# Patient Record
Sex: FEMALE | Race: AMERICAN INDIAN OR ALASKA NATIVE
[De-identification: names, ages, dates, MRNs, and addresses within clinical notes are randomized per-mention and may not be internally consistent; named-entity substitution may affect disease eponyms.]

---

## 2018-11-30 ENCOUNTER — HOSPITAL ENCOUNTER (EMERGENCY)
Dept: HOSPITAL 14 - H.ER | Age: 28
Discharge: HOME | End: 2018-11-30
Payer: COMMERCIAL

## 2018-11-30 VITALS
TEMPERATURE: 97.7 F | DIASTOLIC BLOOD PRESSURE: 66 MMHG | HEART RATE: 65 BPM | SYSTOLIC BLOOD PRESSURE: 105 MMHG | OXYGEN SATURATION: 99 %

## 2018-11-30 VITALS — RESPIRATION RATE: 18 BRPM

## 2018-11-30 DIAGNOSIS — F41.9: ICD-10-CM

## 2018-11-30 DIAGNOSIS — R42: Primary | ICD-10-CM

## 2018-11-30 LAB
BASOPHILS # BLD AUTO: 0.1 K/UL (ref 0–0.2)
BASOPHILS NFR BLD: 0.6 % (ref 0–2)
BUN SERPL-MCNC: 13 MG/DL (ref 7–17)
CALCIUM SERPL-MCNC: 8.9 MG/DL (ref 8.4–10.2)
EOSINOPHIL # BLD AUTO: 0.1 K/UL (ref 0–0.7)
EOSINOPHIL NFR BLD: 0.6 % (ref 0–4)
ERYTHROCYTE [DISTWIDTH] IN BLOOD BY AUTOMATED COUNT: 14 % (ref 11.5–14.5)
GFR NON-AFRICAN AMERICAN: > 60
HGB BLD-MCNC: 11.9 G/DL (ref 12–16)
LYMPHOCYTES # BLD AUTO: 1.7 K/UL (ref 1–4.3)
LYMPHOCYTES NFR BLD AUTO: 18 % (ref 20–40)
MCH RBC QN AUTO: 26.9 PG (ref 27–31)
MCHC RBC AUTO-ENTMCNC: 31.7 G/DL (ref 33–37)
MCV RBC AUTO: 84.7 FL (ref 81–99)
MONOCYTES # BLD: 0.5 K/UL (ref 0–0.8)
MONOCYTES NFR BLD: 5.2 % (ref 0–10)
NEUTROPHILS # BLD: 7 K/UL (ref 1.8–7)
NEUTROPHILS NFR BLD AUTO: 75.6 % (ref 50–75)
NRBC BLD AUTO-RTO: 0 % (ref 0–0)
PLATELET # BLD: 278 K/UL (ref 130–400)
PMV BLD AUTO: 10 FL (ref 7.2–11.7)
RBC # BLD AUTO: 4.41 MIL/UL (ref 3.8–5.2)
WBC # BLD AUTO: 9.3 K/UL (ref 4.8–10.8)

## 2018-11-30 PROCEDURE — 80048 BASIC METABOLIC PNL TOTAL CA: CPT

## 2018-11-30 PROCEDURE — 93005 ELECTROCARDIOGRAM TRACING: CPT

## 2018-11-30 PROCEDURE — 81025 URINE PREGNANCY TEST: CPT

## 2018-11-30 PROCEDURE — 85025 COMPLETE CBC W/AUTO DIFF WBC: CPT

## 2018-11-30 PROCEDURE — 99284 EMERGENCY DEPT VISIT MOD MDM: CPT

## 2018-11-30 PROCEDURE — 96360 HYDRATION IV INFUSION INIT: CPT

## 2018-11-30 NOTE — ED PDOC
HPI: General Adult


Time Seen by Provider: 18 09:17


Chief Complaint (Nursing): Dizziness/Lightheaded


Chief Complaint (Provider): Dizziness


History Per: Patient


History/Exam Limitations: no limitations


Onset/Duration Of Symptoms: Days (x1)


Current Symptoms Are (Timing): Still Present


Additional Complaint(s): 


Hola Taylor is a 28 year old female presenting for evaluation of dizziness, 

nausea, and vomiting onset this morning. Patient states she woke up feeling di

zziness described as a room spinning sensation and nausea. Patient reports she 

sat down to take a shower due to her nausea and had 1 episode of vomiting. 

Patient denies any known alleviating or exacerbating factors at this time. 

Patient reports a history of anxiety, but states her current symptoms are 

different from her previous panic attacks. Patient otherwise denies any 

headache, visual changes, fevers, chills, chest pain, cough, acute shortness of 

breath, abdominal pain, diarrhea, constipation, frequency, dysuria, or 

hematuria.  No numbness, tingles. 








Past Medical History


Reviewed: Historical Data, Nursing Documentation, Vital Signs


Vital Signs: 





                                Last Vital Signs











Temp  98.1 F   18 09:05


 


Pulse  61   18 09:05


 


Resp  18   18 09:05


 


BP  115/69   18 09:05


 


Pulse Ox  100   18 09:05














- Medical History


PMH: Anxiety, Asthma





- Surgical History


Surgical History: No Surg Hx





- Family History


Family History: States: Unknown Family Hx





- Home Medications


Home Medications: 


                                Ambulatory Orders











 Medication  Instructions  Recorded


 


Meclizine [Meclizine*] 25 mg PO Q12 PRN #10 tab 18














- Allergies


Allergies/Adverse Reactions: 


                                    Allergies











Allergy/AdvReac Type Severity Reaction Status Date / Time


 


nickel Allergy  ANAPHYLAXIS Verified 18 09:16














Review of Systems


ROS Statement: Except As Marked, All Systems Reviewed And Found Negative


Constitutional: Negative for: Fever, Chills


Eyes: Negative for: Vision Change


Cardiovascular: Negative for: Chest Pain, Palpitations, Light Headedness


Respiratory: Negative for: Cough, Shortness of Breath


Gastrointestinal: Positive for: Nausea, Vomiting.  Negative for: Abdominal Pain,

 Diarrhea, Constipation


Genitourinary Female: Negative for: Dysuria, Frequency, Incontinence, Hematuria


Neurological: Positive for: Dizziness.  Negative for: Weakness, Numbness, 

Headache





Physical Exam





- Reviewed


Nursing Documentation Reviewed: Yes


Vital Signs Reviewed: Yes





- Physical Exam


Appears: Positive for: Non-toxic, No Acute Distress


Head Exam: Positive for: ATRAUMATIC, NORMAL INSPECTION, NORMOCEPHALIC


Skin: Positive for: Normal Color, Warm, Dry.  Negative for: Rash


Eye Exam: Positive for: EOMI, Normal appearance, PERRL


ENT: Positive for: Normal ENT Inspection


Neck: Positive for: Normal, Painless ROM, Supple


Cardiovascular/Chest: Positive for: Regular Rate, Rhythm.  Negative for: Murmur


Respiratory: Positive for: Normal Breath Sounds.  Negative for: Respiratory 

Distress


Gastrointestinal/Abdominal: Positive for: Normal Exam, Soft.  Negative for: 

Tenderness


Back: Positive for: Normal Inspection.  Negative for: L CVA Tenderness, R CVA 

Tenderness, Vertebral Tenderness


Extremity: Positive for: Normal ROM.  Negative for: Pedal Edema, Deformity


Neurologic/Psych: Positive for: Alert, CNs II-XII (intact), Oriented.  Negative 

for: Motor/Sensory Deficits





- Laboratory Results


Result Diagrams: 


                                 18 10:01





                                 18 10:01


Interpretation Of Abn Labs: no acute





- ECG


ECG: Positive for: Interpreted By Me, Viewed By Me


ECG Rhythm: Positive for: Normal QRS, Normal ST Segment, Sinus Rhythm


O2 Sat by Pulse Oximetry: 100


Pulse Ox Interpretation: Normal





- Progress


ED Course And Treament: 





1127:  Stable.  AAOx3.  Pain free.  Tolerated PO.  Fu with pcp.  No dizziness. 





Medical Decision Making


Medical Decision Makin


Plan:


-EKG


-BMP


-Udip


-CBC


-Meclizine 25mg IV


-1L NS IVB


-Reevaluation





-------------------------------------------

-----------------------------------------------------------------------


Scribe Attestation: 


Documented by Raul Melendrez, acting as a scribe for John Tellez MD.





Provider Scribe Attestation:


All medical record entries made by the Scribe were at my direction and 

personally dictated by me. I have reviewed the chart and agree that the record 

accurately reflects my personal performance of the history, physical exam, 

medical decision making, and the department course for this patient. I have also

 personally directed, reviewed, and agree with the discharge instructions and 

disposition.











Disposition





- Clinical Impression


Clinical Impression: 


 Dizziness








- Patient ED Disposition


Is Patient to be Admitted: No


Counseled Patient/Family Regarding: Studies Performed, Diagnosis, Need For 

Followup, Rx Given





- Disposition


Referrals: 


[facility] [Outside] - 18


Disposition: Routine/Home


Disposition Time: 11:28


Condition: STABLE


Additional Instructions: 


Return if not better in 3 days. 


Prescriptions: 


Meclizine [Meclizine*] 25 mg PO Q12 PRN #10 tab


 PRN Reason: Dizziness


Instructions:  Vertigo (a Type of Dizziness)


Forms:  [facility] (English), [facility] ED School/Work Excuse

## 2018-11-30 NOTE — CARD
--------------- APPROVED REPORT --------------





Date of service: 11/30/2018



EKG Measurement

Heart [id number]

GA 116P3

[id number]

[id number]

[id number]



<Conclusion>

Sinus bradycardia

Otherwise normal ECG

## 2019-04-08 ENCOUNTER — HOSPITAL ENCOUNTER (EMERGENCY)
Dept: HOSPITAL 14 - H.ER | Age: 29
Discharge: HOME | End: 2019-04-08
Payer: COMMERCIAL

## 2019-04-08 VITALS — DIASTOLIC BLOOD PRESSURE: 70 MMHG | SYSTOLIC BLOOD PRESSURE: 120 MMHG | HEART RATE: 70 BPM | TEMPERATURE: 98.6 F

## 2019-04-08 VITALS — RESPIRATION RATE: 20 BRPM | OXYGEN SATURATION: 98 %

## 2019-04-08 VITALS — BODY MASS INDEX: 26 KG/M2
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
## 2019-04-08 DIAGNOSIS — S99.911A: Primary | ICD-10-CM

## 2019-04-08 NOTE — RAD
Date of service: 



04/08/2019



PROCEDURE:  Right Ankle Radiographs.



HISTORY:

 right ankle pain injury 



COMPARISON:

None available.



TECHNIQUE:

3 views obtained.



FINDINGS:



BONES:

Bone alignment and mineralization are normal.  There is no acute 

displaced fracture or bone destruction.  A small well corticated 

ossific density posterior superior to the navicular is most 

compatible with an accessory ossifications center 



JOINTS:

Normal. No osteoarthritis. Ankle mortise maintained. Talar dome intact



SOFT TISSUES:

There is mild lateral soft tissue swelling. 



OTHER FINDINGS:

None.



IMPRESSION:

No acute fracture or dislocation.  Mild lateral soft tissue swelling.

## 2019-04-08 NOTE — ED PDOC
Lower Extremity Pain/Injury


Time Seen by Provider: 04/08/19 10:25


Chief Complaint (Nursing): Lower Extremity Problem/Injury


Chief Complaint (Provider): Lower Extremity Problem/Injury


History Per: Patient


History/Exam Limitations: no limitations


Onset/Duration Of Symptoms: Days (x 3)


Current Symptoms Are (Timing): Still Present


Severity: Moderate


Additional Complaint(s): 


29 year old female with no significant medical history presents to the ED for 

evaluation of a right ankle injury that occurred three days ago. Patient reports

she originally injured her ankle on Saturday and then again injured it yesterday

by twisting it. She treated the injury by elevating, icing and taking Motrin. 

Patient is able to walk but is limping with pain. Denies other injury or 

complaints. 





PMD: none provided 








- Ankle/Foot


Description Of Injury: Twisted





Past Medical History


Reviewed: Historical Data, Nursing Documentation, Vital Signs


Vital Signs: 





                                Last Vital Signs











Temp  98.2 F   04/08/19 09:31


 


Pulse  58 L  04/08/19 09:31


 


Resp  20   04/08/19 09:50


 


BP  124/79   04/08/19 09:31


 


Pulse Ox  98   04/08/19 09:50














- Medical History


PMH: Anxiety, Asthma





- Surgical History


Surgical History: No Surg Hx





- Family History


Family History: States: Unknown Family Hx





- Home Medications


Home Medications: 


                                Ambulatory Orders











 Medication  Instructions  Recorded


 


Meclizine [Meclizine*] 25 mg PO Q12 PRN #10 tab 11/30/18


 


Naproxen 500 mg PO BID #20 tab 04/08/19














- Allergies


Allergies/Adverse Reactions: 


                                    Allergies











Allergy/AdvReac Type Severity Reaction Status Date / Time


 


nickel Allergy  ANAPHYLAXIS Verified 04/08/19 09:50














Review of Systems


ROS Statement: Except As Marked, All Systems Reviewed And Found Negative


Musculoskeletal: Positive for: Foot Pain (right ankle injury)





Physical Exam





- Reviewed


Nursing Documentation Reviewed: Yes


Vital Signs Reviewed: Yes





- Physical Exam


Appears: Positive for: No Acute Distress


Skin: Positive for: Normal Color, Warm, Dry


Eye Exam: Positive for: Normal appearance


Extremity: Positive for: Tenderness (mild tenderness to medial malleolus; no 

swelling), Swelling (swelling and tenderness to the right lateral malleolus), 

Other (limited ROM at ankle).  Negative for: Normal ROM


Neurological/Psych: Positive for: Awake, Alert, Normal Tone, Oriented (x 3).  

Negative for: Motor/Sensory Deficits





- ECG


O2 Sat by Pulse Oximetry: 98 (RA)


Pulse Ox Interpretation: Normal





- Progress


Re-evaluation Time: 13:39


Condition: Re-examined, Improved





Medical Decision Making


Medical Decision Making: 


10:48 


Impression: right ankle pain and injury


Initial Plan:


--Right ankle x-ray 


--Motrin 600 mg PO 





11:45 


Ordered Tramadol because pain persists. 





12:53


X-ray 


FINDINGS:





BONES:


Bone alignment and mineralization are normal.  There is no acute displaced 

fracture or bone destruction.  A small well corticated ossific density posterior

 superior to the navicular is most compatible with an accessory ossifications 

center 





JOINTS:


Normal. No osteoarthritis. Ankle mortise maintained. Talar dome intact





SOFT TISSUES:


There is mild lateral soft tissue swelling. 





OTHER FINDINGS:


None.





IMPRESSION:


No acute fracture or dislocation.  Mild lateral soft tissue swelling.








-----------------------------

--------------------------------------------------------------------


Scribe Attestation:


Documented by Renetta Watts, acting as a scribe for Chris Ordoñez MD 


 


Provider Scribe Attestation:


All medical record entries made by the Scribe were at my direction and 

personally dictated by me. I have reviewed the chart and agree that the record 

accurately reflects my personal performance of the history, physical exam, 

medical decision making, and the department course for this patient. I have also

 personally directed, reviewed, and agree with the discharge instructions and 

disposition








Disposition





- Clinical Impression


Clinical Impression: 


 Ankle injury








- Patient ED Disposition


Is Patient to be Admitted: No


Doctor Will See Patient In The: Office


Counseled Patient/Family Regarding: Studies Performed, Diagnosis, Need For 

Followup





- Disposition


Referrals: 


Podiatry Clinic [Outside]


Disposition: Routine/Home


Disposition Time: 13:39


Condition: GOOD


Additional Instructions: 





PASHA RODRIGUEZ, thank you for letting us take care of you today. Your provider

 was Chris Ordoñez MD and you were treated for RT ANKLE INJURY. The 

emergency medical care you received today was directed at your acute symptoms. 

If you were prescribed any medication, please fill it and take as directed. It 

may take several days for your symptoms to resolve. Return to the Emergency 

Department if your symptoms worsen, do not improve, or if you have any other 

problems.





Please contact your doctor or call one of the physicians/clinics you have been 

referred to that are listed on the Patient Visit Information form that is 

included in your discharge packet. Bring any paperwork you were given at 

discharge with you along with any medications you are taking to your follow up 

visit. Our treatment cannot replace ongoing medical care by a primary care 

provider outside of the emergency department.





Thank you for allowing the [facility] team to be part of your care today.








Prescriptions: 


Naproxen 500 mg PO BID #20 tab


Instructions:  Ankle Sprain


Forms:  [facility] ED School/Work Excuse